# Patient Record
Sex: MALE | Race: OTHER | NOT HISPANIC OR LATINO | URBAN - METROPOLITAN AREA
[De-identification: names, ages, dates, MRNs, and addresses within clinical notes are randomized per-mention and may not be internally consistent; named-entity substitution may affect disease eponyms.]

---

## 2019-08-13 ENCOUNTER — INPATIENT (INPATIENT)
Facility: HOSPITAL | Age: 29
LOS: 2 days | Discharge: HOME | DRG: 340 | End: 2019-08-16
Attending: SURGERY | Admitting: SURGERY
Payer: COMMERCIAL

## 2019-08-13 VITALS
DIASTOLIC BLOOD PRESSURE: 86 MMHG | SYSTOLIC BLOOD PRESSURE: 144 MMHG | OXYGEN SATURATION: 96 % | WEIGHT: 184.97 LBS | HEART RATE: 94 BPM | TEMPERATURE: 98 F | RESPIRATION RATE: 18 BRPM

## 2019-08-13 DIAGNOSIS — Z90.89 ACQUIRED ABSENCE OF OTHER ORGANS: Chronic | ICD-10-CM

## 2019-08-13 LAB
ALBUMIN SERPL ELPH-MCNC: 4.5 G/DL — SIGNIFICANT CHANGE UP (ref 3.3–5)
ALP SERPL-CCNC: 74 U/L — SIGNIFICANT CHANGE UP (ref 40–120)
ALT FLD-CCNC: 25 U/L — SIGNIFICANT CHANGE UP (ref 10–45)
ANION GAP SERPL CALC-SCNC: 13 MMOL/L — SIGNIFICANT CHANGE UP (ref 5–17)
APPEARANCE UR: CLEAR — SIGNIFICANT CHANGE UP
APTT BLD: 30.5 SEC — SIGNIFICANT CHANGE UP (ref 27.5–36.3)
AST SERPL-CCNC: 19 U/L — SIGNIFICANT CHANGE UP (ref 10–40)
BASOPHILS # BLD AUTO: 0.05 K/UL — SIGNIFICANT CHANGE UP (ref 0–0.2)
BASOPHILS NFR BLD AUTO: 0.3 % — SIGNIFICANT CHANGE UP (ref 0–2)
BILIRUB SERPL-MCNC: 1.2 MG/DL — SIGNIFICANT CHANGE UP (ref 0.2–1.2)
BILIRUB UR-MCNC: NEGATIVE — SIGNIFICANT CHANGE UP
BLD GP AB SCN SERPL QL: NEGATIVE — SIGNIFICANT CHANGE UP
BUN SERPL-MCNC: 10 MG/DL — SIGNIFICANT CHANGE UP (ref 7–23)
CALCIUM SERPL-MCNC: 9.8 MG/DL — SIGNIFICANT CHANGE UP (ref 8.4–10.5)
CHLORIDE SERPL-SCNC: 100 MMOL/L — SIGNIFICANT CHANGE UP (ref 96–108)
CO2 SERPL-SCNC: 25 MMOL/L — SIGNIFICANT CHANGE UP (ref 22–31)
COLOR SPEC: YELLOW — SIGNIFICANT CHANGE UP
CREAT SERPL-MCNC: 1.11 MG/DL — SIGNIFICANT CHANGE UP (ref 0.5–1.3)
DIFF PNL FLD: NEGATIVE — SIGNIFICANT CHANGE UP
EOSINOPHIL # BLD AUTO: 0.01 K/UL — SIGNIFICANT CHANGE UP (ref 0–0.5)
EOSINOPHIL NFR BLD AUTO: 0.1 % — SIGNIFICANT CHANGE UP (ref 0–6)
GLUCOSE SERPL-MCNC: 101 MG/DL — HIGH (ref 70–99)
GLUCOSE UR QL: NEGATIVE — SIGNIFICANT CHANGE UP
HCT VFR BLD CALC: 42 % — SIGNIFICANT CHANGE UP (ref 39–50)
HGB BLD-MCNC: 14 G/DL — SIGNIFICANT CHANGE UP (ref 13–17)
IMM GRANULOCYTES NFR BLD AUTO: 0.5 % — SIGNIFICANT CHANGE UP (ref 0–1.5)
INR BLD: 1.16 — SIGNIFICANT CHANGE UP (ref 0.88–1.16)
KETONES UR-MCNC: 15 MG/DL
LEUKOCYTE ESTERASE UR-ACNC: NEGATIVE — SIGNIFICANT CHANGE UP
LYMPHOCYTES # BLD AUTO: 1.65 K/UL — SIGNIFICANT CHANGE UP (ref 1–3.3)
LYMPHOCYTES # BLD AUTO: 11.3 % — LOW (ref 13–44)
MCHC RBC-ENTMCNC: 28.3 PG — SIGNIFICANT CHANGE UP (ref 27–34)
MCHC RBC-ENTMCNC: 33.3 GM/DL — SIGNIFICANT CHANGE UP (ref 32–36)
MCV RBC AUTO: 85 FL — SIGNIFICANT CHANGE UP (ref 80–100)
MONOCYTES # BLD AUTO: 0.93 K/UL — HIGH (ref 0–0.9)
MONOCYTES NFR BLD AUTO: 6.4 % — SIGNIFICANT CHANGE UP (ref 2–14)
NEUTROPHILS # BLD AUTO: 11.83 K/UL — HIGH (ref 1.8–7.4)
NEUTROPHILS NFR BLD AUTO: 81.4 % — HIGH (ref 43–77)
NITRITE UR-MCNC: NEGATIVE — SIGNIFICANT CHANGE UP
NRBC # BLD: 0 /100 WBCS — SIGNIFICANT CHANGE UP (ref 0–0)
PH UR: 6.5 — SIGNIFICANT CHANGE UP (ref 5–8)
PLATELET # BLD AUTO: 260 K/UL — SIGNIFICANT CHANGE UP (ref 150–400)
POTASSIUM SERPL-MCNC: 4 MMOL/L — SIGNIFICANT CHANGE UP (ref 3.5–5.3)
POTASSIUM SERPL-SCNC: 4 MMOL/L — SIGNIFICANT CHANGE UP (ref 3.5–5.3)
PROT SERPL-MCNC: 7.5 G/DL — SIGNIFICANT CHANGE UP (ref 6–8.3)
PROT UR-MCNC: NEGATIVE MG/DL — SIGNIFICANT CHANGE UP
PROTHROM AB SERPL-ACNC: 13.2 SEC — HIGH (ref 10–12.9)
RBC # BLD: 4.94 M/UL — SIGNIFICANT CHANGE UP (ref 4.2–5.8)
RBC # FLD: 12.3 % — SIGNIFICANT CHANGE UP (ref 10.3–14.5)
RH IG SCN BLD-IMP: POSITIVE — SIGNIFICANT CHANGE UP
SODIUM SERPL-SCNC: 138 MMOL/L — SIGNIFICANT CHANGE UP (ref 135–145)
SP GR SPEC: 1.01 — SIGNIFICANT CHANGE UP (ref 1–1.03)
UROBILINOGEN FLD QL: 0.2 E.U./DL — SIGNIFICANT CHANGE UP
WBC # BLD: 14.54 K/UL — HIGH (ref 3.8–10.5)
WBC # FLD AUTO: 14.54 K/UL — HIGH (ref 3.8–10.5)

## 2019-08-13 PROCEDURE — 99285 EMERGENCY DEPT VISIT HI MDM: CPT

## 2019-08-13 PROCEDURE — 93010 ELECTROCARDIOGRAM REPORT: CPT

## 2019-08-13 RX ORDER — CEFTRIAXONE 500 MG/1
1000 INJECTION, POWDER, FOR SOLUTION INTRAMUSCULAR; INTRAVENOUS ONCE
Refills: 0 | Status: COMPLETED | OUTPATIENT
Start: 2019-08-13 | End: 2019-08-13

## 2019-08-13 RX ORDER — CEFTRIAXONE 500 MG/1
1000 INJECTION, POWDER, FOR SOLUTION INTRAMUSCULAR; INTRAVENOUS EVERY 24 HOURS
Refills: 0 | Status: DISCONTINUED | OUTPATIENT
Start: 2019-08-13 | End: 2019-08-14

## 2019-08-13 RX ORDER — METRONIDAZOLE 500 MG
500 TABLET ORAL EVERY 8 HOURS
Refills: 0 | Status: DISCONTINUED | OUTPATIENT
Start: 2019-08-14 | End: 2019-08-14

## 2019-08-13 RX ORDER — ONDANSETRON 8 MG/1
4 TABLET, FILM COATED ORAL EVERY 8 HOURS
Refills: 0 | Status: DISCONTINUED | OUTPATIENT
Start: 2019-08-13 | End: 2019-08-16

## 2019-08-13 RX ORDER — ENOXAPARIN SODIUM 100 MG/ML
40 INJECTION SUBCUTANEOUS DAILY
Refills: 0 | Status: DISCONTINUED | OUTPATIENT
Start: 2019-08-13 | End: 2019-08-16

## 2019-08-13 RX ORDER — SODIUM CHLORIDE 9 MG/ML
1000 INJECTION, SOLUTION INTRAVENOUS
Refills: 0 | Status: DISCONTINUED | OUTPATIENT
Start: 2019-08-13 | End: 2019-08-14

## 2019-08-13 RX ORDER — METRONIDAZOLE 500 MG
500 TABLET ORAL ONCE
Refills: 0 | Status: COMPLETED | OUTPATIENT
Start: 2019-08-13 | End: 2019-08-13

## 2019-08-13 RX ORDER — HYDROMORPHONE HYDROCHLORIDE 2 MG/ML
0.5 INJECTION INTRAMUSCULAR; INTRAVENOUS; SUBCUTANEOUS EVERY 6 HOURS
Refills: 0 | Status: DISCONTINUED | OUTPATIENT
Start: 2019-08-13 | End: 2019-08-14

## 2019-08-13 RX ORDER — BUPIVACAINE 13.3 MG/ML
20 INJECTION, SUSPENSION, LIPOSOMAL INFILTRATION ONCE
Refills: 0 | Status: DISCONTINUED | OUTPATIENT
Start: 2019-08-13 | End: 2019-08-16

## 2019-08-13 RX ORDER — METRONIDAZOLE 500 MG
TABLET ORAL
Refills: 0 | Status: DISCONTINUED | OUTPATIENT
Start: 2019-08-13 | End: 2019-08-14

## 2019-08-13 RX ORDER — HYDROMORPHONE HYDROCHLORIDE 2 MG/ML
1 INJECTION INTRAMUSCULAR; INTRAVENOUS; SUBCUTANEOUS EVERY 6 HOURS
Refills: 0 | Status: DISCONTINUED | OUTPATIENT
Start: 2019-08-13 | End: 2019-08-14

## 2019-08-13 RX ADMIN — Medication 100 MILLIGRAM(S): at 20:35

## 2019-08-13 RX ADMIN — Medication 500 MILLIGRAM(S): at 22:00

## 2019-08-13 RX ADMIN — SODIUM CHLORIDE 120 MILLILITER(S): 9 INJECTION, SOLUTION INTRAVENOUS at 22:01

## 2019-08-13 RX ADMIN — CEFTRIAXONE 1000 MILLIGRAM(S): 500 INJECTION, POWDER, FOR SOLUTION INTRAMUSCULAR; INTRAVENOUS at 20:33

## 2019-08-13 RX ADMIN — CEFTRIAXONE 100 MILLIGRAM(S): 500 INJECTION, POWDER, FOR SOLUTION INTRAMUSCULAR; INTRAVENOUS at 20:07

## 2019-08-13 NOTE — H&P ADULT - HISTORY OF PRESENT ILLNESS
28 M no sig PMH/PSH presenting with 4 days of abdominal pain. Pt states sx started last Friday with dull intermittent periumbilical discomfort that progressively worsened and developed into intermittent sharp 6/10 RLQ pain today, prompting him to go to urgent care today, was subsequently recommended CT scan at [facility] which demonstrated acute appendicitis per pt. He denies associated N/V, states he has no appetite today and that pain worsens after meals, denies Fever/chills, denies chest pain/dyspnea, passing flatus, having regular BM (last one this afternoon), voiding well without issues. Pt states he has had similar pain which resolved on its own twice in the past, once 1.5 yrs ago and the sencond 1 yr ago. Pt denies personal or family hx of IBD, denies recent travel/sick contacts.     PMH: asthma as child, since resolved  PSH: tonsillectomy 4yrs old  Meds: N/A  Social: denies smoking, drinks once a week, denies illicit drug use  Fam: noncontributory, no hx of cancer

## 2019-08-13 NOTE — ED PROVIDER NOTE - OBJECTIVE STATEMENT
27 y/o m no pmh presents stating had outpatient CT today which he was told shows acute appendicitis.  Pt stating he has been having right lower abd pain for the past 4 days which prompted his visit to ED.  Denies fever, chills, n/v/d, dysuria, all other ROS negative.

## 2019-08-13 NOTE — ED PROVIDER NOTE - CLINICAL SUMMARY MEDICAL DECISION MAKING FREE TEXT BOX
27 y/o m presents with acute appendicitis on outpatient CT; vss, labs with leukocytosis, given ceftriaxone and flagyl.  Surgery called for ED consult, pending evaluation

## 2019-08-13 NOTE — ED PROVIDER NOTE - ATTENDING CONTRIBUTION TO CARE
29 yo M p/w periumbilical ab pain nausea x 4 d.  TTP periumbilical region, no r/g, VSS, pt well, nad.  CT outpt showing acute appendicitis.  Plan labs, IVF, abx, admit to surgery.

## 2019-08-13 NOTE — H&P ADULT - NSHPLABSRESULTS_GEN_ALL_CORE
14.0   14.54 )-----------( 260      ( 13 Aug 2019 19:33 )             42.0   08-13    138  |  100  |  10  ----------------------------<  101<H>  4.0   |  25  |  1.11    Ca    9.8      13 Aug 2019 19:33    TPro  7.5  /  Alb  4.5  /  TBili  1.2  /  DBili  x   /  AST  19  /  ALT  25  /  AlkPhos  74  08-13    *CT scan completed as an outpt at [facility]- reviewed and preliminary read with extensive periappendiceal fat stranding and distention 22.4 mm

## 2019-08-13 NOTE — H&P ADULT - NSHPPHYSICALEXAM_GEN_ALL_CORE
General: NAD, resting comfortably  Neuro: AAOX3, EOMI, PERRLA, no scleral icterus  Pulm: CTAB, Nonlabored breathing  CV: S1/S2 normal, no murmurs  Abdomen: soft, nondistended, mild tenderness in R middle quadrant, Lamas's negative, no rebound, no guarding  Extremities: WWP, No LE edema b/l

## 2019-08-14 LAB
ANION GAP SERPL CALC-SCNC: 12 MMOL/L — SIGNIFICANT CHANGE UP (ref 5–17)
BUN SERPL-MCNC: 10 MG/DL — SIGNIFICANT CHANGE UP (ref 7–23)
CALCIUM SERPL-MCNC: 9.6 MG/DL — SIGNIFICANT CHANGE UP (ref 8.4–10.5)
CHLORIDE SERPL-SCNC: 102 MMOL/L — SIGNIFICANT CHANGE UP (ref 96–108)
CO2 SERPL-SCNC: 25 MMOL/L — SIGNIFICANT CHANGE UP (ref 22–31)
CREAT SERPL-MCNC: 1.14 MG/DL — SIGNIFICANT CHANGE UP (ref 0.5–1.3)
GLUCOSE SERPL-MCNC: 135 MG/DL — HIGH (ref 70–99)
HCT VFR BLD CALC: 39.7 % — SIGNIFICANT CHANGE UP (ref 39–50)
HGB BLD-MCNC: 13.1 G/DL — SIGNIFICANT CHANGE UP (ref 13–17)
MAGNESIUM SERPL-MCNC: 2.1 MG/DL — SIGNIFICANT CHANGE UP (ref 1.6–2.6)
MCHC RBC-ENTMCNC: 28.5 PG — SIGNIFICANT CHANGE UP (ref 27–34)
MCHC RBC-ENTMCNC: 33 GM/DL — SIGNIFICANT CHANGE UP (ref 32–36)
MCV RBC AUTO: 86.3 FL — SIGNIFICANT CHANGE UP (ref 80–100)
NRBC # BLD: 0 /100 WBCS — SIGNIFICANT CHANGE UP (ref 0–0)
PHOSPHATE SERPL-MCNC: 4.5 MG/DL — SIGNIFICANT CHANGE UP (ref 2.5–4.5)
PLATELET # BLD AUTO: 252 K/UL — SIGNIFICANT CHANGE UP (ref 150–400)
POTASSIUM SERPL-MCNC: 4.6 MMOL/L — SIGNIFICANT CHANGE UP (ref 3.5–5.3)
POTASSIUM SERPL-SCNC: 4.6 MMOL/L — SIGNIFICANT CHANGE UP (ref 3.5–5.3)
RBC # BLD: 4.6 M/UL — SIGNIFICANT CHANGE UP (ref 4.2–5.8)
RBC # FLD: 12.1 % — SIGNIFICANT CHANGE UP (ref 10.3–14.5)
SODIUM SERPL-SCNC: 139 MMOL/L — SIGNIFICANT CHANGE UP (ref 135–145)
WBC # BLD: 13.7 K/UL — HIGH (ref 3.8–10.5)
WBC # FLD AUTO: 13.7 K/UL — HIGH (ref 3.8–10.5)

## 2019-08-14 RX ORDER — DEXTROSE MONOHYDRATE, SODIUM CHLORIDE, AND POTASSIUM CHLORIDE 50; .745; 4.5 G/1000ML; G/1000ML; G/1000ML
1000 INJECTION, SOLUTION INTRAVENOUS
Refills: 0 | Status: DISCONTINUED | OUTPATIENT
Start: 2019-08-14 | End: 2019-08-16

## 2019-08-14 RX ORDER — PIPERACILLIN AND TAZOBACTAM 4; .5 G/20ML; G/20ML
3.38 INJECTION, POWDER, LYOPHILIZED, FOR SOLUTION INTRAVENOUS ONCE
Refills: 0 | Status: COMPLETED | OUTPATIENT
Start: 2019-08-14 | End: 2019-08-14

## 2019-08-14 RX ORDER — CEFOTETAN DISODIUM 1 G
1 VIAL (EA) INJECTION EVERY 12 HOURS
Refills: 0 | Status: DISCONTINUED | OUTPATIENT
Start: 2019-08-14 | End: 2019-08-14

## 2019-08-14 RX ORDER — ACETAMINOPHEN 500 MG
1000 TABLET ORAL ONCE
Refills: 0 | Status: DISCONTINUED | OUTPATIENT
Start: 2019-08-14 | End: 2019-08-16

## 2019-08-14 RX ORDER — PIPERACILLIN AND TAZOBACTAM 4; .5 G/20ML; G/20ML
3.38 INJECTION, POWDER, LYOPHILIZED, FOR SOLUTION INTRAVENOUS EVERY 6 HOURS
Refills: 0 | Status: DISCONTINUED | OUTPATIENT
Start: 2019-08-14 | End: 2019-08-15

## 2019-08-14 RX ORDER — KETOROLAC TROMETHAMINE 30 MG/ML
15 SYRINGE (ML) INJECTION EVERY 6 HOURS
Refills: 0 | Status: DISCONTINUED | OUTPATIENT
Start: 2019-08-14 | End: 2019-08-15

## 2019-08-14 RX ADMIN — PIPERACILLIN AND TAZOBACTAM 200 GRAM(S): 4; .5 INJECTION, POWDER, LYOPHILIZED, FOR SOLUTION INTRAVENOUS at 17:31

## 2019-08-14 RX ADMIN — Medication 100 MILLIGRAM(S): at 06:31

## 2019-08-14 RX ADMIN — Medication 100 GRAM(S): at 10:32

## 2019-08-14 RX ADMIN — ENOXAPARIN SODIUM 40 MILLIGRAM(S): 100 INJECTION SUBCUTANEOUS at 11:57

## 2019-08-14 RX ADMIN — Medication 15 MILLIGRAM(S): at 17:33

## 2019-08-14 RX ADMIN — PIPERACILLIN AND TAZOBACTAM 200 GRAM(S): 4; .5 INJECTION, POWDER, LYOPHILIZED, FOR SOLUTION INTRAVENOUS at 23:55

## 2019-08-14 RX ADMIN — Medication 15 MILLIGRAM(S): at 12:10

## 2019-08-14 RX ADMIN — Medication 15 MILLIGRAM(S): at 17:45

## 2019-08-14 RX ADMIN — DEXTROSE MONOHYDRATE, SODIUM CHLORIDE, AND POTASSIUM CHLORIDE 150 MILLILITER(S): 50; .745; 4.5 INJECTION, SOLUTION INTRAVENOUS at 18:59

## 2019-08-14 RX ADMIN — Medication 15 MILLIGRAM(S): at 07:05

## 2019-08-14 RX ADMIN — Medication 100 MILLIGRAM(S): at 14:28

## 2019-08-14 RX ADMIN — Medication 15 MILLIGRAM(S): at 06:31

## 2019-08-14 RX ADMIN — Medication 15 MILLIGRAM(S): at 11:57

## 2019-08-14 RX ADMIN — SODIUM CHLORIDE 120 MILLILITER(S): 9 INJECTION, SOLUTION INTRAVENOUS at 01:08

## 2019-08-14 NOTE — BRIEF OPERATIVE NOTE - OPERATION/FINDINGS
Laparoscopic appendectomy performed for acute perforated appendicitis. Appendix identified in RLQ, densely adherent to surrounding structures in abscess cavity. Tissue very friable and appendix was taken in pieces. Endoloop placed at base of appendix x2. Ligasure used to dissect mesoappendix. Hemostasis confirmed. Pelvis, RLQ, and RUQ thoroughly irrigated and suctioned.

## 2019-08-14 NOTE — PACU DISCHARGE NOTE - COMMENTS
Discharged from PACU after meeting criteria. Report given to 8W SURAJ Garay. VSS. A&Ox4. RRWNL on 4L NC. CM showing SR. Abdomen with multiple lap sited with steri strip dsg and ILYA via RLQ. ILYA draining sanguineous secretions. Temple 16fr to bedside drainage. Pt NPO. L FA Angio 18g with LR at 120cc/h. R AC HL angio 20g patent. Patient t/f on stretcher unmonitored with IVF and oxygen in progress. Transported by transport team. One bag of belongings sent with patient. Brother at bedside. Patient denies pain oe discomfort.

## 2019-08-14 NOTE — PROGRESS NOTE ADULT - SUBJECTIVE AND OBJECTIVE BOX
Team 4 Surgery Post-Op Note     Pre-Op Dx: Acute appendicitis   Procedure: Laparoscopic appendectomy    Surgeon: Thomas    Subjective: Patient resting comfortably in bed. His pain is well controlled and he has no complaints at this time. No n/v, CP, SOB, chills, lightheadedness. No ROBF yet.     Vital Signs Last 24 Hrs  T(C): 37.6 (14 Aug 2019 02:46), Max: 37.6 (13 Aug 2019 20:02)  T(F): 99.6 (14 Aug 2019 02:46), Max: 99.6 (13 Aug 2019 20:02)  HR: 89 (14 Aug 2019 02:46) (72 - 96)  BP: 127/77 (14 Aug 2019 02:46) (103/56 - 146/90)  BP(mean): 76 (14 Aug 2019 02:00) (76 - 96)  RR: 16 (14 Aug 2019 02:46) (8 - 19)  SpO2: 95% (14 Aug 2019 02:00) (93% - 99%)    Physical Exam:  General: NAD, resting comfortably in bed  Pulmonary: Nonlabored breathing, no respiratory distress  Abdominal: soft, nondistended, nontender to palpation with no rebound or guarding. Incisions CDI. R pelvic ILYA with ss output.   Extremities: WWP  Neuro: A/O x 3      LABS:                        14.0   14.54 )-----------( 260      ( 13 Aug 2019 19:33 )             42.0     08-13    138  |  100  |  10  ----------------------------<  101<H>  4.0   |  25  |  1.11    Ca    9.8      13 Aug 2019 19:33    TPro  7.5  /  Alb  4.5  /  TBili  1.2  /  DBili  x   /  AST  19  /  ALT  25  /  AlkPhos  74  08-13    PT/INR - ( 13 Aug 2019 19:33 )   PT: 13.2 sec;   INR: 1.16          PTT - ( 13 Aug 2019 19:33 )  PTT:30.5 sec  CAPILLARY BLOOD GLUCOSE        Urinalysis Basic - ( 13 Aug 2019 21:49 )    Color: Yellow / Appearance: Clear / S.010 / pH: x  Gluc: x / Ketone: 15 mg/dL  / Bili: Negative / Urobili: 0.2 E.U./dL   Blood: x / Protein: NEGATIVE mg/dL / Nitrite: NEGATIVE   Leuk Esterase: NEGATIVE / RBC: x / WBC x   Sq Epi: x / Non Sq Epi: x / Bacteria: x      LIVER FUNCTIONS - ( 13 Aug 2019 19:33 )  Alb: 4.5 g/dL / Pro: 7.5 g/dL / ALK PHOS: 74 U/L / ALT: 25 U/L / AST: 19 U/L / GGT: x           ABO Interpretation: A ( @ 19:11)        Radiology and Additional Studies:    Assessment:28y Male s/p Laparoscopic appendectomy      Plan:  Pain/nausea control PRN  cefotetan/flagyl  IVF, NPO  AM labs  Temple  Incentive spirometer/OOB/Ambulate

## 2019-08-15 LAB
ANION GAP SERPL CALC-SCNC: 10 MMOL/L — SIGNIFICANT CHANGE UP (ref 5–17)
BUN SERPL-MCNC: 11 MG/DL — SIGNIFICANT CHANGE UP (ref 7–23)
CALCIUM SERPL-MCNC: 8.8 MG/DL — SIGNIFICANT CHANGE UP (ref 8.4–10.5)
CHLORIDE SERPL-SCNC: 106 MMOL/L — SIGNIFICANT CHANGE UP (ref 96–108)
CO2 SERPL-SCNC: 26 MMOL/L — SIGNIFICANT CHANGE UP (ref 22–31)
CREAT SERPL-MCNC: 1.07 MG/DL — SIGNIFICANT CHANGE UP (ref 0.5–1.3)
GLUCOSE SERPL-MCNC: 142 MG/DL — HIGH (ref 70–99)
GRAM STN FLD: SIGNIFICANT CHANGE UP
HCT VFR BLD CALC: 36.6 % — LOW (ref 39–50)
HGB BLD-MCNC: 11.9 G/DL — LOW (ref 13–17)
MAGNESIUM SERPL-MCNC: 2.1 MG/DL — SIGNIFICANT CHANGE UP (ref 1.6–2.6)
MCHC RBC-ENTMCNC: 28.1 PG — SIGNIFICANT CHANGE UP (ref 27–34)
MCHC RBC-ENTMCNC: 32.5 GM/DL — SIGNIFICANT CHANGE UP (ref 32–36)
MCV RBC AUTO: 86.3 FL — SIGNIFICANT CHANGE UP (ref 80–100)
NRBC # BLD: 0 /100 WBCS — SIGNIFICANT CHANGE UP (ref 0–0)
PHOSPHATE SERPL-MCNC: 2.8 MG/DL — SIGNIFICANT CHANGE UP (ref 2.5–4.5)
PLATELET # BLD AUTO: 243 K/UL — SIGNIFICANT CHANGE UP (ref 150–400)
POTASSIUM SERPL-MCNC: 3.7 MMOL/L — SIGNIFICANT CHANGE UP (ref 3.5–5.3)
POTASSIUM SERPL-SCNC: 3.7 MMOL/L — SIGNIFICANT CHANGE UP (ref 3.5–5.3)
RBC # BLD: 4.24 M/UL — SIGNIFICANT CHANGE UP (ref 4.2–5.8)
RBC # FLD: 12.1 % — SIGNIFICANT CHANGE UP (ref 10.3–14.5)
SODIUM SERPL-SCNC: 142 MMOL/L — SIGNIFICANT CHANGE UP (ref 135–145)
SPECIMEN SOURCE: SIGNIFICANT CHANGE UP
WBC # BLD: 8.24 K/UL — SIGNIFICANT CHANGE UP (ref 3.8–10.5)
WBC # FLD AUTO: 8.24 K/UL — SIGNIFICANT CHANGE UP (ref 3.8–10.5)

## 2019-08-15 RX ORDER — CEFTRIAXONE 500 MG/1
2000 INJECTION, POWDER, FOR SOLUTION INTRAMUSCULAR; INTRAVENOUS EVERY 24 HOURS
Refills: 0 | Status: DISCONTINUED | OUTPATIENT
Start: 2019-08-15 | End: 2019-08-16

## 2019-08-15 RX ORDER — POTASSIUM PHOSPHATE, MONOBASIC POTASSIUM PHOSPHATE, DIBASIC 236; 224 MG/ML; MG/ML
15 INJECTION, SOLUTION INTRAVENOUS ONCE
Refills: 0 | Status: COMPLETED | OUTPATIENT
Start: 2019-08-15 | End: 2019-08-15

## 2019-08-15 RX ORDER — METRONIDAZOLE 500 MG
500 TABLET ORAL EVERY 8 HOURS
Refills: 0 | Status: DISCONTINUED | OUTPATIENT
Start: 2019-08-15 | End: 2019-08-16

## 2019-08-15 RX ADMIN — Medication 15 MILLIGRAM(S): at 17:08

## 2019-08-15 RX ADMIN — POTASSIUM PHOSPHATE, MONOBASIC POTASSIUM PHOSPHATE, DIBASIC 63.75 MILLIMOLE(S): 236; 224 INJECTION, SOLUTION INTRAVENOUS at 08:26

## 2019-08-15 RX ADMIN — Medication 15 MILLIGRAM(S): at 12:45

## 2019-08-15 RX ADMIN — Medication 15 MILLIGRAM(S): at 00:32

## 2019-08-15 RX ADMIN — Medication 15 MILLIGRAM(S): at 00:17

## 2019-08-15 RX ADMIN — Medication 15 MILLIGRAM(S): at 17:20

## 2019-08-15 RX ADMIN — Medication 15 MILLIGRAM(S): at 23:22

## 2019-08-15 RX ADMIN — CEFTRIAXONE 100 MILLIGRAM(S): 500 INJECTION, POWDER, FOR SOLUTION INTRAMUSCULAR; INTRAVENOUS at 22:02

## 2019-08-15 RX ADMIN — Medication 15 MILLIGRAM(S): at 06:10

## 2019-08-15 RX ADMIN — Medication 15 MILLIGRAM(S): at 05:10

## 2019-08-15 RX ADMIN — Medication 15 MILLIGRAM(S): at 12:28

## 2019-08-15 RX ADMIN — DEXTROSE MONOHYDRATE, SODIUM CHLORIDE, AND POTASSIUM CHLORIDE 150 MILLILITER(S): 50; .745; 4.5 INJECTION, SOLUTION INTRAVENOUS at 08:42

## 2019-08-15 RX ADMIN — ENOXAPARIN SODIUM 40 MILLIGRAM(S): 100 INJECTION SUBCUTANEOUS at 12:28

## 2019-08-15 RX ADMIN — PIPERACILLIN AND TAZOBACTAM 200 GRAM(S): 4; .5 INJECTION, POWDER, LYOPHILIZED, FOR SOLUTION INTRAVENOUS at 05:10

## 2019-08-15 RX ADMIN — Medication 100 MILLIGRAM(S): at 23:07

## 2019-08-15 RX ADMIN — PIPERACILLIN AND TAZOBACTAM 200 GRAM(S): 4; .5 INJECTION, POWDER, LYOPHILIZED, FOR SOLUTION INTRAVENOUS at 12:28

## 2019-08-15 RX ADMIN — PIPERACILLIN AND TAZOBACTAM 200 GRAM(S): 4; .5 INJECTION, POWDER, LYOPHILIZED, FOR SOLUTION INTRAVENOUS at 17:08

## 2019-08-15 RX ADMIN — Medication 15 MILLIGRAM(S): at 23:07

## 2019-08-15 NOTE — PROGRESS NOTE ADULT - SUBJECTIVE AND OBJECTIVE BOX
INTERVAL HPI/OVERNIGHT EVENTS:   SURGERY ATTENDING    STATUS POST:      Laparoscopic appendectomy, Lysis of adhesions, evacuation of right lower quadrant abscess, washout, drainage.      POST OPERATIVE DAY #: 1    SUBJECTIVE:  Flatus: [x ] YES [ ] NO             Bowel Movement: [x ] YES [ ] NO  Pain (0-10):       2     Pain Control Adequate: [x ] YES [ ] NO  Nausea: [ ] YES [x ] NO            Vomiting: [ ] YES [x ] NO  Diarrhea: [ ] YES [x ] NO         Constipation: [ ] YES [x ] NO     Chest Pain: [ ] YES [x ] NO    SOB:  [ ] YES [x ] NO    MEDICATIONS  (STANDING):  BUpivacaine liposome 1.3% Injectable (no eMAR) 20 milliLiter(s) Local Injection once  cefTRIAXone   IVPB 2000 milliGRAM(s) IV Intermittent every 24 hours  dextrose 5% + lactated ringers with potassium chloride 20 mEq/L 1000 milliLiter(s) (125 mL/Hr) IV Continuous <Continuous>  enoxaparin Injectable 40 milliGRAM(s) SubCutaneous daily  ketorolac   Injectable 15 milliGRAM(s) IV Push every 6 hours  metroNIDAZOLE  IVPB 500 milliGRAM(s) IV Intermittent every 8 hours    MEDICATIONS  (PRN):  acetaminophen  IVPB .. 1000 milliGRAM(s) IV Intermittent once PRN Moderate Pain (4 - 6)  ondansetron Injectable 4 milliGRAM(s) IV Push every 8 hours PRN Nausea and/or Vomiting      Vital Signs Last 24 Hrs  T(C): 36.9 (15 Aug 2019 15:13), Max: 36.9 (15 Aug 2019 08:01)  T(F): 98.5 (15 Aug 2019 15:13), Max: 98.5 (15 Aug 2019 15:13)  HR: 75 (15 Aug 2019 15:13) (64 - 80)  BP: 121/78 (15 Aug 2019 15:13) (117/62 - 134/83)  BP(mean): --  RR: 16 (15 Aug 2019 15:13) (15 - 17)  SpO2: 97% (15 Aug 2019 15:13) (97% - 98%)    PHYSICAL EXAM:      Constitutional:    Eyes:    ENMT:    Neck:    Breasts:    Back:    Respiratory:    Cardiovascular:    Gastrointestinal:    Genitourinary:    Rectal:    Extremities:    Vascular:    Neurological:    Skin:    Lymph Nodes:    Musculoskeletal:    Psychiatric:        I&O's Detail    14 Aug 2019 07:01  -  15 Aug 2019 07:00  --------------------------------------------------------  IN:  Total IN: 0 mL    OUT:    Bulb: 65 mL    Indwelling Catheter - Urethral: 3100 mL  Total OUT: 3165 mL    Total NET: -3165 mL      15 Aug 2019 07:  -  15 Aug 2019 18:48  --------------------------------------------------------  IN:    Oral Fluid: 300 mL  Total IN: 300 mL    OUT:    Bulb: 20 mL    Voided: 1000 mL  Total OUT: 1020 mL    Total NET: -720 mL          LABS:                        11.9   8.24  )-----------( 243      ( 15 Aug 2019 05:50 )             36.6     08-15    142  |  106  |  11  ----------------------------<  142<H>  3.7   |  26  |  1.07    Ca    8.8      15 Aug 2019 05:50  Phos  2.8     08-15  Mg     2.1     08-15    TPro  7.5  /  Alb  4.5  /  TBili  1.2  /  DBili  x   /  AST  19  /  ALT  25  /  AlkPhos  74  08-13    PT/INR - ( 13 Aug 2019 19:33 )   PT: 13.2 sec;   INR: 1.16          PTT - ( 13 Aug 2019 19:33 )  PTT:30.5 sec  Urinalysis Basic - ( 13 Aug 2019 21:49 )    Color: Yellow / Appearance: Clear / S.010 / pH: x  Gluc: x / Ketone: 15 mg/dL  / Bili: Negative / Urobili: 0.2 E.U./dL   Blood: x / Protein: NEGATIVE mg/dL / Nitrite: NEGATIVE   Leuk Esterase: NEGATIVE / RBC: x / WBC x   Sq Epi: x / Non Sq Epi: x / Bacteria: x        RADIOLOGY & ADDITIONAL STUDIES:

## 2019-08-15 NOTE — PROGRESS NOTE ADULT - ASSESSMENT
28 M no sig PMH/PSH presenting with 4 days of abdominal pain and acute appendicits now s/p laparoscopic appendectomy    Pain/nausea control  NPO/IVF  Hugo Vargas/SCDs  OOBA/IS  AM labs

## 2019-08-15 NOTE — PROGRESS NOTE ADULT - ATTENDING COMMENTS
Abdomen soft, distended, appropriately tender, BS+, Flatus+, BM+, ILYA serous, wounds dry, clean, intact, tolerating diet.  Possible D/C home tomorrow.

## 2019-08-15 NOTE — CONSULT NOTE ADULT - SUBJECTIVE AND OBJECTIVE BOX
HPI:  28 M no sig PMH/PSH presenting with 4 days of abdominal pain. Pt states sx started last Friday with dull intermittent periumbilical discomfort that progressively worsened and developed into intermittent sharp 6/10 RLQ pain today, prompting him to go to urgent care today, was subsequently recommended CT scan at [facility] which demonstrated acute appendicitis per pt. He denies associated N/V, states he has no appetite today and that pain worsens after meals, denies Fever/chills, denies chest pain/dyspnea, passing flatus, having regular BM (last one this afternoon), voiding well without issues. Pt states he has had similar pain which resolved on its own twice in the past, once 1.5 yrs ago and the sencond 1 yr ago. Pt denies personal or family hx of IBD, denies recent travel/sick contacts.     PMH: asthma as child, since resolved  PSH: tonsillectomy 4yrs old  Meds: N/A  Social: denies smoking, drinks once a week, denies illicit drug use  Fam: noncontributory, no hx of cancer (13 Aug 2019 20:47)      PAST MEDICAL & SURGICAL HISTORY:  Asthma: resolved as a child  History of tonsillectomy: 4yrs old      REVIEW OF SYSTEMS      General:	    Skin/Breast:  	  Ophthalmologic:  	  ENMT:	    Respiratory and Thorax:  	  Cardiovascular:	    Gastrointestinal:	    Genitourinary:	    Musculoskeletal:	    Neurological:	    Psychiatric:	    Hematology/Lymphatics:	    Endocrine:	    Allergic/Immunologic:	    MEDICATIONS  (STANDING):  BUpivacaine liposome 1.3% Injectable (no eMAR) 20 milliLiter(s) Local Injection once  dextrose 5% + lactated ringers with potassium chloride 20 mEq/L 1000 milliLiter(s) (150 mL/Hr) IV Continuous <Continuous>  enoxaparin Injectable 40 milliGRAM(s) SubCutaneous daily  ketorolac   Injectable 15 milliGRAM(s) IV Push every 6 hours  piperacillin/tazobactam IVPB.. 3.375 Gram(s) IV Intermittent every 6 hours    MEDICATIONS  (PRN):  acetaminophen  IVPB .. 1000 milliGRAM(s) IV Intermittent once PRN Moderate Pain (4 - 6)  ondansetron Injectable 4 milliGRAM(s) IV Push every 8 hours PRN Nausea and/or Vomiting      Allergies    No Known Allergies    Intolerances        SOCIAL HISTORY:    FAMILY HISTORY:      Vital Signs Last 24 Hrs  T(C): 36.9 (15 Aug 2019 08:01), Max: 36.9 (15 Aug 2019 08:01)  T(F): 98.4 (15 Aug 2019 08:01), Max: 98.4 (15 Aug 2019 08:01)  HR: 80 (15 Aug 2019 08:01) (64 - 80)  BP: 117/62 (15 Aug 2019 08:01) (117/62 - 134/83)  BP(mean): --  RR: 15 (15 Aug 2019 08:01) (15 - 17)  SpO2: 98% (15 Aug 2019 08:01) (97% - 98%)    PHYSICAL EXAM:      Constitutional:    Eyes:    ENMT:    Neck:    Breasts:    Back:    Respiratory:    Cardiovascular:    Gastrointestinal:    Genitourinary:    Rectal:    Extremities:    Vascular:    Neurological:    Skin:    Lymph Nodes:    Musculoskeletal:    Psychiatric:        LABS:                        11.9   8.24  )-----------( 243      ( 15 Aug 2019 05:50 )             36.6     08-15    142  |  106  |  11  ----------------------------<  142<H>  3.7   |  26  |  1.07    Ca    8.8      15 Aug 2019 05:50  Phos  2.8     08-15  Mg     2.1     08-15    TPro  7.5  /  Alb  4.5  /  TBili  1.2  /  DBili  x   /  AST  19  /  ALT  25  /  AlkPhos  74  08-13    PT/INR - ( 13 Aug 2019 19:33 )   PT: 13.2 sec;   INR: 1.16          PTT - ( 13 Aug 2019 19:33 )  PTT:30.5 sec  Urinalysis Basic - ( 13 Aug 2019 21:49 )    Color: Yellow / Appearance: Clear / S.010 / pH: x  Gluc: x / Ketone: 15 mg/dL  / Bili: Negative / Urobili: 0.2 E.U./dL   Blood: x / Protein: NEGATIVE mg/dL / Nitrite: NEGATIVE   Leuk Esterase: NEGATIVE / RBC: x / WBC x   Sq Epi: x / Non Sq Epi: x / Bacteria: x        RADIOLOGY & ADDITIONAL STUDIES: HPI:  28 M no sig PMH/PSH presenting with 4 days of abdominal pain. Pt states sx started last Friday with dull intermittent periumbilical discomfort that progressively worsened and developed into intermittent sharp 6/10 RLQ pain today, prompting him to go to urgent care today, was subsequently recommended CT scan at [facility] which demonstrated acute appendicitis per pt. He denies associated N/V, states he has no appetite today and that pain worsens after meals, denies Fever/chills, denies chest pain/dyspnea, passing flatus, having regular BM (last one this afternoon), voiding well without issues. Pt states he has had similar pain which resolved on its own twice in the past, once 1.5 yrs ago and the sencond 1 yr ago. Pt denies personal or family hx of IBD, denies recent travel/sick contacts.       S/P Lap appi for perforated appendicitis  Already with flatus  No fever    ROS otherwise negative      PMH: asthma as child, since resolved  PSH: tonsillectomy 4yrs old  Meds: N/A  Social: denies smoking, drinks once a week, denies illicit drug use  Fam: noncontributory, no hx of cancer (13 Aug 2019 20:47)      PAST MEDICAL & SURGICAL HISTORY:  Asthma: resolved as a child  History of tonsillectomy: 4yrs old      MEDICATIONS  (STANDING):  BUpivacaine liposome 1.3% Injectable (no eMAR) 20 milliLiter(s) Local Injection once  dextrose 5% + lactated ringers with potassium chloride 20 mEq/L 1000 milliLiter(s) (150 mL/Hr) IV Continuous <Continuous>  enoxaparin Injectable 40 milliGRAM(s) SubCutaneous daily  ketorolac   Injectable 15 milliGRAM(s) IV Push every 6 hours  piperacillin/tazobactam IVPB.. 3.375 Gram(s) IV Intermittent every 6 hours    MEDICATIONS  (PRN):  acetaminophen  IVPB .. 1000 milliGRAM(s) IV Intermittent once PRN Moderate Pain (4 - 6)  ondansetron Injectable 4 milliGRAM(s) IV Push every 8 hours PRN Nausea and/or Vomiting      Allergies    No Known Allergies      SOCIAL HISTORY:  Lives at home  No unique exposures  No on antibiotics recent year    FAMILY HISTORY:  Noncontributory    Vital Signs Last 24 Hrs  T(C): 36.9 (15 Aug 2019 08:01), Max: 36.9 (15 Aug 2019 08:01)  T(F): 98.4 (15 Aug 2019 08:01), Max: 98.4 (15 Aug 2019 08:01)  HR: 80 (15 Aug 2019 08:01) (64 - 80)  BP: 117/62 (15 Aug 2019 08:01) (117/62 - 134/83)  BP(mean): --  RR: 15 (15 Aug 2019 08:01) (15 - 17)  SpO2: 98% (15 Aug 2019 08:01) (97% - 98%)  Awake and alert  Healthy appearing  Oral mucosa moist  Neck supple   No rash or mucosal lesions  RRR  Chest CTA  Abd softly distended with bowel tones and drain  LE no edema      LABS:                        11.9   8.24  )-----------( 243      ( 15 Aug 2019 05:50 )             36.6     08-15    142  |  106  |  11  ----------------------------<  142<H>  3.7   |  26  |  1.07    Ca    8.8      15 Aug 2019 05:50  Phos  2.8     08-15  Mg     2.1     08-15    TPro  7.5  /  Alb  4.5  /  TBili  1.2  /  DBili  x   /  AST  19  /  ALT  25  /  AlkPhos  74  08-13    PT/INR - ( 13 Aug 2019 19:33 )   PT: 13.2 sec;   INR: 1.16          PTT - ( 13 Aug 2019 19:33 )  PTT:30.5 sec  Urinalysis Basic - ( 13 Aug 2019 21:49 )    Color: Yellow / Appearance: Clear / S.010 / pH: x  Gluc: x / Ketone: 15 mg/dL  / Bili: Negative / Urobili: 0.2 E.U./dL   Blood: x / Protein: NEGATIVE mg/dL / Nitrite: NEGATIVE   Leuk Esterase: NEGATIVE / RBC: x / WBC x   Sq Epi: x / Non Sq Epi: x / Bacteria: x    Culture - Fungal, Other (19 @ 18:13)    Specimen Source: .Other Appendix    Culture Results:   Testing in progress    Culture - Surgical Swab (19 @ 08:18)    Gram Stain:   Moderate Gram Positive Cocci in Pairs and Chains  Few Gram Negative Rods  Moderate WBC's    Specimen Source: .Surgical Swab Appendix    Culture Results:   Moderate Streptococcus constellatus  Rare Escherichia coli  Susceptibility to follow.

## 2019-08-15 NOTE — PROGRESS NOTE ADULT - SUBJECTIVE AND OBJECTIVE BOX
POST-OP DAY: 1 s/p lap appendectomy      SUBJECTIVE: Feeling well, +flatus/BM, ambulating. Patient seen and examined bedside by chief resident.    enoxaparin Injectable 40 milliGRAM(s) SubCutaneous daily  piperacillin/tazobactam IVPB.. 3.375 Gram(s) IV Intermittent every 6 hours    MEDICATIONS  (PRN):  acetaminophen  IVPB .. 1000 milliGRAM(s) IV Intermittent once PRN Moderate Pain (4 - 6)  ondansetron Injectable 4 milliGRAM(s) IV Push every 8 hours PRN Nausea and/or Vomiting      I&O's Detail    14 Aug 2019 07:01  -  15 Aug 2019 07:00  --------------------------------------------------------  IN:  Total IN: 0 mL    OUT:    Bulb: 65 mL    Indwelling Catheter - Urethral: 3100 mL  Total OUT: 3165 mL    Total NET: -3165 mL          Vital Signs Last 24 Hrs  T(C): 36.2 (15 Aug 2019 04:20), Max: 36.7 (14 Aug 2019 15:37)  T(F): 97.1 (15 Aug 2019 04:20), Max: 98.1 (14 Aug 2019 15:37)  HR: 64 (15 Aug 2019 04:20) (64 - 73)  BP: 117/69 (15 Aug 2019 04:20) (117/69 - 134/83)  BP(mean): --  RR: 17 (15 Aug 2019 04:20) (16 - 17)  SpO2: 97% (15 Aug 2019 04:20) (97% - 98%)    General: NAD, resting comfortably in bed  C/V: NSR  Pulm: Nonlabored breathing, no respiratory distress  Abd: soft, NT/ND, ILYA SS  Extrem:  SCDs in place    LABS:                        11.9   8.24  )-----------( 243      ( 15 Aug 2019 05:50 )             36.6     08-15    142  |  106  |  11  ----------------------------<  142<H>  3.7   |  26  |  1.07    Ca    8.8      15 Aug 2019 05:50  Phos  2.8     08-15  Mg     2.1     08-15    TPro  7.5  /  Alb  4.5  /  TBili  1.2  /  DBili  x   /  AST  19  /  ALT  25  /  AlkPhos  74  08-13    PT/INR - ( 13 Aug 2019 19:33 )   PT: 13.2 sec;   INR: 1.16          PTT - ( 13 Aug 2019 19:33 )  PTT:30.5 sec  Urinalysis Basic - ( 13 Aug 2019 21:49 )    Color: Yellow / Appearance: Clear / S.010 / pH: x  Gluc: x / Ketone: 15 mg/dL  / Bili: Negative / Urobili: 0.2 E.U./dL   Blood: x / Protein: NEGATIVE mg/dL / Nitrite: NEGATIVE   Leuk Esterase: NEGATIVE / RBC: x / WBC x   Sq Epi: x / Non Sq Epi: x / Bacteria: x        RADIOLOGY & ADDITIONAL STUDIES:

## 2019-08-15 NOTE — CONSULT NOTE ADULT - ASSESSMENT
Perforated appendicitis  S/P Laparoscopic appendectomy  No apparent risk factors for MDR jessica      RECOMMEND  Change Pip-Tazo to Ceftriaxone 2 gm IV daily and Metronidazole 500 mg IV q 8 hours

## 2019-08-16 VITALS
TEMPERATURE: 99 F | DIASTOLIC BLOOD PRESSURE: 80 MMHG | OXYGEN SATURATION: 98 % | SYSTOLIC BLOOD PRESSURE: 124 MMHG | RESPIRATION RATE: 16 BRPM | HEART RATE: 55 BPM

## 2019-08-16 LAB
-  AMIKACIN: SIGNIFICANT CHANGE UP
-  AMPICILLIN/SULBACTAM: SIGNIFICANT CHANGE UP
-  AMPICILLIN/SULBACTAM: SIGNIFICANT CHANGE UP
-  AMPICILLIN: SIGNIFICANT CHANGE UP
-  AMPICILLIN: SIGNIFICANT CHANGE UP
-  AZTREONAM: SIGNIFICANT CHANGE UP
-  CEFAZOLIN: SIGNIFICANT CHANGE UP
-  CEFAZOLIN: SIGNIFICANT CHANGE UP
-  CEFEPIME: SIGNIFICANT CHANGE UP
-  CEFOTAXIME: SIGNIFICANT CHANGE UP
-  CEFOXITIN: SIGNIFICANT CHANGE UP
-  CEFTAZIDIME: SIGNIFICANT CHANGE UP
-  CEFTRIAXONE: SIGNIFICANT CHANGE UP
-  CEFUROXIME: SIGNIFICANT CHANGE UP
-  CLINDAMYCIN: SIGNIFICANT CHANGE UP
-  ERTAPENEM: SIGNIFICANT CHANGE UP
-  ERYTHROMYCIN: SIGNIFICANT CHANGE UP
-  GENTAMICIN: SIGNIFICANT CHANGE UP
-  MEROPENEM: SIGNIFICANT CHANGE UP
-  MOXIFLOXACIN(AEROBIC): SIGNIFICANT CHANGE UP
-  PENICILLIN: SIGNIFICANT CHANGE UP
-  PIPERACILLIN/TAZOBACTAM: SIGNIFICANT CHANGE UP
-  TETRACYCLINE: SIGNIFICANT CHANGE UP
-  TIGECYCLINE: SIGNIFICANT CHANGE UP
-  TOBRAMYCIN: SIGNIFICANT CHANGE UP
-  TRIMETHOPRIM/SULFAMETHOXAZOLE: SIGNIFICANT CHANGE UP
-  VANCOMYCIN: SIGNIFICANT CHANGE UP
ANION GAP SERPL CALC-SCNC: 10 MMOL/L — SIGNIFICANT CHANGE UP (ref 5–17)
BUN SERPL-MCNC: 6 MG/DL — LOW (ref 7–23)
CALCIUM SERPL-MCNC: 9.7 MG/DL — SIGNIFICANT CHANGE UP (ref 8.4–10.5)
CHLORIDE SERPL-SCNC: 106 MMOL/L — SIGNIFICANT CHANGE UP (ref 96–108)
CO2 SERPL-SCNC: 27 MMOL/L — SIGNIFICANT CHANGE UP (ref 22–31)
CREAT SERPL-MCNC: 1.07 MG/DL — SIGNIFICANT CHANGE UP (ref 0.5–1.3)
GLUCOSE SERPL-MCNC: 104 MG/DL — HIGH (ref 70–99)
HCT VFR BLD CALC: 41.8 % — SIGNIFICANT CHANGE UP (ref 39–50)
HGB BLD-MCNC: 13.4 G/DL — SIGNIFICANT CHANGE UP (ref 13–17)
MAGNESIUM SERPL-MCNC: 1.9 MG/DL — SIGNIFICANT CHANGE UP (ref 1.6–2.6)
MCHC RBC-ENTMCNC: 28.2 PG — SIGNIFICANT CHANGE UP (ref 27–34)
MCHC RBC-ENTMCNC: 32.1 GM/DL — SIGNIFICANT CHANGE UP (ref 32–36)
MCV RBC AUTO: 87.8 FL — SIGNIFICANT CHANGE UP (ref 80–100)
METHOD TYPE: SIGNIFICANT CHANGE UP
NRBC # BLD: 0 /100 WBCS — SIGNIFICANT CHANGE UP (ref 0–0)
PHOSPHATE SERPL-MCNC: 2.8 MG/DL — SIGNIFICANT CHANGE UP (ref 2.5–4.5)
PLATELET # BLD AUTO: 288 K/UL — SIGNIFICANT CHANGE UP (ref 150–400)
POTASSIUM SERPL-MCNC: 3.9 MMOL/L — SIGNIFICANT CHANGE UP (ref 3.5–5.3)
POTASSIUM SERPL-SCNC: 3.9 MMOL/L — SIGNIFICANT CHANGE UP (ref 3.5–5.3)
RBC # BLD: 4.76 M/UL — SIGNIFICANT CHANGE UP (ref 4.2–5.8)
RBC # FLD: 12.3 % — SIGNIFICANT CHANGE UP (ref 10.3–14.5)
SODIUM SERPL-SCNC: 143 MMOL/L — SIGNIFICANT CHANGE UP (ref 135–145)
SURGICAL PATHOLOGY STUDY: SIGNIFICANT CHANGE UP
WBC # BLD: 7.56 K/UL — SIGNIFICANT CHANGE UP (ref 3.8–10.5)
WBC # FLD AUTO: 7.56 K/UL — SIGNIFICANT CHANGE UP (ref 3.8–10.5)

## 2019-08-16 PROCEDURE — 87070 CULTURE OTHR SPECIMN AEROBIC: CPT

## 2019-08-16 PROCEDURE — 83735 ASSAY OF MAGNESIUM: CPT

## 2019-08-16 PROCEDURE — 85027 COMPLETE CBC AUTOMATED: CPT

## 2019-08-16 PROCEDURE — 87075 CULTR BACTERIA EXCEPT BLOOD: CPT

## 2019-08-16 PROCEDURE — 87102 FUNGUS ISOLATION CULTURE: CPT

## 2019-08-16 PROCEDURE — 80048 BASIC METABOLIC PNL TOTAL CA: CPT

## 2019-08-16 PROCEDURE — 86901 BLOOD TYPING SEROLOGIC RH(D): CPT

## 2019-08-16 PROCEDURE — 96375 TX/PRO/DX INJ NEW DRUG ADDON: CPT

## 2019-08-16 PROCEDURE — 96365 THER/PROPH/DIAG IV INF INIT: CPT

## 2019-08-16 PROCEDURE — 87186 SC STD MICRODIL/AGAR DIL: CPT

## 2019-08-16 PROCEDURE — 85730 THROMBOPLASTIN TIME PARTIAL: CPT

## 2019-08-16 PROCEDURE — 93005 ELECTROCARDIOGRAM TRACING: CPT

## 2019-08-16 PROCEDURE — 87184 SC STD DISK METHOD PER PLATE: CPT

## 2019-08-16 PROCEDURE — 99285 EMERGENCY DEPT VISIT HI MDM: CPT | Mod: 25

## 2019-08-16 PROCEDURE — 85610 PROTHROMBIN TIME: CPT

## 2019-08-16 PROCEDURE — 81003 URINALYSIS AUTO W/O SCOPE: CPT

## 2019-08-16 PROCEDURE — 84100 ASSAY OF PHOSPHORUS: CPT

## 2019-08-16 PROCEDURE — 86850 RBC ANTIBODY SCREEN: CPT

## 2019-08-16 PROCEDURE — 86900 BLOOD TYPING SEROLOGIC ABO: CPT

## 2019-08-16 PROCEDURE — 80053 COMPREHEN METABOLIC PANEL: CPT

## 2019-08-16 PROCEDURE — 88304 TISSUE EXAM BY PATHOLOGIST: CPT

## 2019-08-16 PROCEDURE — 36415 COLL VENOUS BLD VENIPUNCTURE: CPT

## 2019-08-16 PROCEDURE — 85025 COMPLETE CBC W/AUTO DIFF WBC: CPT

## 2019-08-16 PROCEDURE — 87116 MYCOBACTERIA CULTURE: CPT

## 2019-08-16 RX ORDER — POTASSIUM PHOSPHATE, MONOBASIC POTASSIUM PHOSPHATE, DIBASIC 236; 224 MG/ML; MG/ML
15 INJECTION, SOLUTION INTRAVENOUS ONCE
Refills: 0 | Status: COMPLETED | OUTPATIENT
Start: 2019-08-16 | End: 2019-08-16

## 2019-08-16 RX ORDER — MAGNESIUM SULFATE 500 MG/ML
1 VIAL (ML) INJECTION ONCE
Refills: 0 | Status: DISCONTINUED | OUTPATIENT
Start: 2019-08-16 | End: 2019-08-16

## 2019-08-16 RX ORDER — DOCUSATE SODIUM 100 MG
1 CAPSULE ORAL
Qty: 6 | Refills: 0
Start: 2019-08-16 | End: 2019-08-18

## 2019-08-16 RX ADMIN — Medication 100 MILLIGRAM(S): at 06:03

## 2019-08-16 RX ADMIN — POTASSIUM PHOSPHATE, MONOBASIC POTASSIUM PHOSPHATE, DIBASIC 63.75 MILLIMOLE(S): 236; 224 INJECTION, SOLUTION INTRAVENOUS at 09:59

## 2019-08-16 RX ADMIN — ENOXAPARIN SODIUM 40 MILLIGRAM(S): 100 INJECTION SUBCUTANEOUS at 12:15

## 2019-08-16 RX ADMIN — DEXTROSE MONOHYDRATE, SODIUM CHLORIDE, AND POTASSIUM CHLORIDE 125 MILLILITER(S): 50; .745; 4.5 INJECTION, SOLUTION INTRAVENOUS at 09:08

## 2019-08-16 RX ADMIN — Medication 100 MILLIGRAM(S): at 15:44

## 2019-08-16 NOTE — PROGRESS NOTE ADULT - SUBJECTIVE AND OBJECTIVE BOX
INTERVAL HPI/OVERNIGHT EVENTS:   SURGERY ATTENDING    STATUS POST: Laparoscopic appendectomy, Lysis of adhesions, evacuation of right lower quadrant abscess, washout, drainage.           POST OPERATIVE DAY #: 2    SUBJECTIVE:  Flatus: [x ] YES [ ] NO             Bowel Movement: [x ] YES [ ] NO  Pain (0-10):       2     Pain Control Adequate: [x ] YES [ ] NO  Nausea: [ ] YES [x ] NO            Vomiting: [ ] YES [x ] NO  Diarrhea: [ ] YES [x ] NO         Constipation: [ ] YES [x ] NO     Chest Pain: [ ] YES [x ] NO    SOB:  [ ] YES [x ] NO      MEDICATIONS  (STANDING):  BUpivacaine liposome 1.3% Injectable (no eMAR) 20 milliLiter(s) Local Injection once  cefTRIAXone   IVPB 2000 milliGRAM(s) IV Intermittent every 24 hours  dextrose 5% + lactated ringers with potassium chloride 20 mEq/L 1000 milliLiter(s) (125 mL/Hr) IV Continuous <Continuous>  enoxaparin Injectable 40 milliGRAM(s) SubCutaneous daily  magnesium sulfate  IVPB 1 Gram(s) IV Intermittent once  metroNIDAZOLE  IVPB 500 milliGRAM(s) IV Intermittent every 8 hours    MEDICATIONS  (PRN):  acetaminophen  IVPB .. 1000 milliGRAM(s) IV Intermittent once PRN Moderate Pain (4 - 6)  ondansetron Injectable 4 milliGRAM(s) IV Push every 8 hours PRN Nausea and/or Vomiting      Vital Signs Last 24 Hrs  T(C): 37 (16 Aug 2019 15:22), Max: 37 (15 Aug 2019 20:15)  T(F): 98.6 (16 Aug 2019 15:22), Max: 98.6 (15 Aug 2019 20:15)  HR: 55 (16 Aug 2019 15:22) (55 - 70)  BP: 124/80 (16 Aug 2019 15:22) (111/70 - 126/81)  BP(mean): --  RR: 16 (16 Aug 2019 15:22) (15 - 16)  SpO2: 98% (16 Aug 2019 15:22) (96% - 98%)    PHYSICAL EXAM:      Constitutional:    Eyes:    ENMT:    Neck:    Breasts:    Back:    Respiratory:    Cardiovascular:    Gastrointestinal:    Genitourinary:    Rectal:    Extremities:    Vascular:    Neurological:    Skin:    Lymph Nodes:    Musculoskeletal:    Psychiatric:        I&O's Detail    15 Aug 2019 07:01  -  16 Aug 2019 07:00  --------------------------------------------------------  IN:    Oral Fluid: 300 mL  Total IN: 300 mL    OUT:    Bulb: 50 mL    Voided: 1000 mL  Total OUT: 1050 mL    Total NET: -750 mL      16 Aug 2019 07:01  -  16 Aug 2019 19:40  --------------------------------------------------------  IN:  Total IN: 0 mL    OUT:    Bulb: 30 mL  Total OUT: 30 mL    Total NET: -30 mL          LABS:                        13.4   7.56  )-----------( 288      ( 16 Aug 2019 06:27 )             41.8     08-16    143  |  106  |  6<L>  ----------------------------<  104<H>  3.9   |  27  |  1.07    Ca    9.7      16 Aug 2019 06:27  Phos  2.8     08-16  Mg     1.9     08-16            RADIOLOGY & ADDITIONAL STUDIES:

## 2019-08-16 NOTE — DISCHARGE NOTE PROVIDER - CARE PROVIDER_API CALL
Pooja Guzman (MD)  Surgery  [address], [address]  [place], [place] [address]  Phone: [phone]  Fax: [phone]  Follow Up Time:

## 2019-08-16 NOTE — PROGRESS NOTE ADULT - SUBJECTIVE AND OBJECTIVE BOX
POST-OP DAY: 2 s/p lap appendectomy      SUBJECTIVE: +flatus/BM, ambulating, tolerating diet. Patient seen and examined bedside by chief resident.    cefTRIAXone   IVPB 2000 milliGRAM(s) IV Intermittent every 24 hours  enoxaparin Injectable 40 milliGRAM(s) SubCutaneous daily  metroNIDAZOLE  IVPB 500 milliGRAM(s) IV Intermittent every 8 hours    MEDICATIONS  (PRN):  acetaminophen  IVPB .. 1000 milliGRAM(s) IV Intermittent once PRN Moderate Pain (4 - 6)  ondansetron Injectable 4 milliGRAM(s) IV Push every 8 hours PRN Nausea and/or Vomiting      I&O's Detail    15 Aug 2019 07:01  -  16 Aug 2019 07:00  --------------------------------------------------------  IN:    Oral Fluid: 300 mL  Total IN: 300 mL    OUT:    Bulb: 50 mL    Voided: 1000 mL  Total OUT: 1050 mL    Total NET: -750 mL          Vital Signs Last 24 Hrs  T(C): 37 (16 Aug 2019 05:18), Max: 37 (15 Aug 2019 20:15)  T(F): 98.6 (16 Aug 2019 05:18), Max: 98.6 (15 Aug 2019 20:15)  HR: 70 (16 Aug 2019 05:18) (69 - 80)  BP: 118/72 (16 Aug 2019 05:18) (117/62 - 126/81)  BP(mean): --  RR: 16 (16 Aug 2019 05:18) (15 - 16)  SpO2: 96% (16 Aug 2019 05:18) (96% - 98%)    General: NAD, resting comfortably in bed  C/V: NSR  Pulm: Nonlabored breathing, no respiratory distress  Abd: soft, NT/N, incisions CDI ILYA SS      LABS:                        13.4   7.56  )-----------( 288      ( 16 Aug 2019 06:27 )             41.8     08-16    143  |  106  |  x   ----------------------------<  104<H>  3.9   |  27  |  1.07    Ca    9.7      16 Aug 2019 06:27  Phos  2.8     08-16  Mg     1.9     08-16

## 2019-08-16 NOTE — DIETITIAN INITIAL EVALUATION ADULT. - OTHER INFO
Pt presents with abdominal pain x4 days; acute appendicitis.  Pt is POD2 s/p lap appendectomy.   Pt was following a Regular diet prior to admission with good appetite/intake at baseline.  Pt has been weight-stable.  Pt remains on Clear Liquids diet with good tolerance.  Pt denies n/v/d/c or pain at this time.  Skin: abdominal surgical incision.

## 2019-08-16 NOTE — DISCHARGE NOTE NURSING/CASE MANAGEMENT/SOCIAL WORK - NSDCDPATPORTLINK_GEN_ALL_CORE
You can access the [facility] Patient Portal, offered by [facility], by registering with the following website: [email or web address]

## 2019-08-16 NOTE — DISCHARGE NOTE PROVIDER - NSDCFUADDINST_GEN_ALL_CORE_FT
1. Ambulate as tolerated  2. Ice packs to the abdomen at all times  3. Please drink at least 2L water every 24 hours  4. Take 2 extra strength tylenol + 1 advil (3 pills total) every 6 hours  5. You may take Vicodin at bedtime or for breakthrough pain as needed  6. Please continue a liquid diet until you have a regular bowel movement  7. Please take probiotics daily  8. Contact your doctor or go to the ER for fever > 101.5, chills, nausea, vomiting, chest pain, shortness of breath, pain not controlled by medication or excessive bleeding.  9. Please follow up with Dr. Guzman in 1-2 weeks; you may call the office to make an appointment at your earliest convenience. 1. Ambulate as tolerated  2. Ice packs to the abdomen at all times  3. Please drink at least 2L water every 24 hours  4. Take 2 extra strength tylenol + 1 advil (3 pills total) every 6 hours  5. You may take Vicodin at bedtime or for breakthrough pain as needed  6. Please continue a liquid diet until you have a regular bowel movement  7. Please take probiotics daily  8. Contact your doctor or go to the ER for fever > 101.5, chills, nausea, vomiting, chest pain, shortness of breath, pain not controlled by medication or excessive bleeding.  9. Please follow up with Dr. Guzman in 1-2 weeks; you may call the office to make an appointment at your earliest convenience.  10. You have been prescribed oral antibiotics. Please be sure to complete the entire course as directed.

## 2019-08-16 NOTE — DIETITIAN INITIAL EVALUATION ADULT. - ENERGY NEEDS
ABW used to calculate energy needs due to pt's current body weight within % IBW  Needs calculated for age and increased protein requirements for post-op healing

## 2019-08-16 NOTE — DISCHARGE NOTE PROVIDER - HOSPITAL COURSE
28 M no sig PMH/PSH presenting with 4 days of abdominal pain and acute appendicits now s/p laparoscopic appendectomy. Patient's post-operative course was uncomplicated. Diet was advanced as tolerated and pain was well controlled on medication. On day of discharge, pt deemed stable and ready to return home with plan to follow up as an outpatient. 28 M no sig PMH/PSH presenting with 4 days of abdominal pain and acute appendicits now s/p laparoscopic appendectomy, appendix was found to be perforated and patient was put on IV antibiotics. Patient's post-operative course was uncomplicated. Diet was advanced as tolerated and pain was well controlled on medication. On day of discharge, pt deemed stable and ready to return home with plan to follow up as an outpatient.

## 2019-08-16 NOTE — PROGRESS NOTE ADULT - ATTENDING COMMENTS
Abdomen soft, distended, appropriately tender, BS+, Flatus+, BM+, ILYA serous, wounds dry, clean, intact, tolerating diet.  D/C ILYA, D/C home on oral ABX with F/U in the office.

## 2019-08-16 NOTE — PROGRESS NOTE ADULT - ASSESSMENT
28 M no sig PMH/PSH presenting with 4 days of abdominal pain and acute appendicitis now s/p laparoscopic appendectomy    Pain/nausea control  CLD/IVF  Ceftriaxone/flagyl  ILYA  Lovenox/SCDs  OOBA/IS  AM labs

## 2019-08-17 LAB
CULTURE RESULTS: SIGNIFICANT CHANGE UP
ORGANISM # SPEC MICROSCOPIC CNT: SIGNIFICANT CHANGE UP
SPECIMEN SOURCE: SIGNIFICANT CHANGE UP

## 2019-08-23 DIAGNOSIS — K35.33 ACUTE APPENDICITIS WITH PERFORATION, LOCALIZED PERITONITIS, AND GANGRENE, WITH ABSCESS: ICD-10-CM

## 2019-08-23 DIAGNOSIS — K35.80 UNSPECIFIED ACUTE APPENDICITIS: ICD-10-CM

## 2019-09-14 LAB
CULTURE RESULTS: SIGNIFICANT CHANGE UP
SPECIMEN SOURCE: SIGNIFICANT CHANGE UP

## 2019-09-27 LAB
ACID FAST STN SPEC: NEGATIVE — SIGNIFICANT CHANGE UP
AFB SPECIMEN PROCESSING: SIGNIFICANT CHANGE UP
MYCOBACTERIUM SPEC QL CULT: NEGATIVE — SIGNIFICANT CHANGE UP

## 2021-10-13 NOTE — DISCHARGE NOTE PROVIDER - NSDCQMSTAIRS_GEN_ALL_CORE
See his [employer] message.   Last oV on 1/27/2020 with Dr Mejia.     He has not scheduled yet. Should we send message back to schedule first and discuss at OV?   
No